# Patient Record
Sex: MALE | Race: WHITE
[De-identification: names, ages, dates, MRNs, and addresses within clinical notes are randomized per-mention and may not be internally consistent; named-entity substitution may affect disease eponyms.]

---

## 2018-11-29 NOTE — ED UPPER EXTREMITY
General


Stated Complaint:  FALL,R HAND PAIN


Source:  patient


Exam Limitations:  no limitations





History of Present Illness


Date Seen by Provider:  Nov 29, 2018


Time Seen by Provider:  17:05


Initial Comments


ER with reports of a fall down 2-3 steps about 2-3 hours ago. He has subsequent 

right hand pain where he landed. Pain is over the back of the right hand over 

the second third and fourth metacarpals where there is also swelling. No other 

injury.


Onset:  this afternoon


Severity:  moderate


Pain/Injury Location:  right hand


Method of Injury:  fell


Modifying Factors:  Worse With Movement





Allergies and Home Medications


Allergies


Coded Allergies:  


     No Known Drug Allergies (Unverified , 1/28/15)





Home Medications


Aripiprazole 10 Mg Tablet, 1 TAB DAILY, (Reported)


Diclofenac Potassium 50 Mg Tablet, 50 MG PO Q6H PRN for ankle pain/swelling


   Prescribed by: JONNY HILLS on 8/13/16 2007





Patient Home Medication List


Home Medication List Reviewed:  Yes





Review of Systems


Constitutional:  see HPI


EENTM:  see HPI


Respiratory:  no symptoms reported


Cardiovascular:  no symptoms reported


Genitourinary:  no symptoms reported


Musculoskeletal:  see HPI


Skin:  no symptoms reported


Psychiatric/Neurological:  No Symptoms Reported





Past Medical-Social-Family Hx


Patient Social History


Type Used:  Cigarettes


Recent Foreign Travel:  No


Contact w/Someone Who Travel:  No


Recent Hopitalizations:  No





Immunizations Up To Date


Tetanus Booster (TDap):  Unknown


PED Vaccines UTD:  No





Seasonal Allergies


Seasonal Allergies:  No





Past Medical History


Orthopedic


Reproductive Disorders:  No


Fractures





Physical Exam


Vital Signs





Vital Signs - First Documented








 11/29/18





 17:05


 


Temp 98.8


 


Pulse 83


 


Resp 16


 


B/P (MAP) 110/68 (82)


 


Pulse Ox 98


 


O2 Delivery Room Air





Capillary Refill :


Height, Weight, BMI


Height: 5'6"


Weight: 115lbs. oz. 52.908315pw;  BMI


Method:Stated


General Appearance:  WD/WN, no apparent distress


HEENT:  PERRL/EOMI, normal ENT inspection


Respiratory:  no respiratory distress, no accessory muscle use


Shoulder:  normal inspection, non-tender


Elbow/Forearm:  normal inspection, non-tender


Hand:  Right, swelling (swelling and tenderness over the dorsal aspect right 

hand second third and fourth mcp joints)


Neurologic/Tendon:  normal sensation, normal motor functions, normal tendon 

functions


Neurologic/Psychiatric:  alert, normal mood/affect, oriented x 3


Skin:  normal color, warm/dry





Progress/Results/Core Measures


Results/Orders


My Orders





Orders - GIANFRANCO GUZMAN


Hand, Right, 3 Views (11/29/18 17:10)


Hydrocodone/Apap 5/325 Tablet (Lortab 5 (11/29/18 17:15)





Medications Given in ED





Current Medications








 Medications  Dose


 Ordered  Sig/Cory


 Route  Start Time


 Stop Time Status Last Admin


Dose Admin


 


 Acetaminophen/


 Hydrocodone Bitart  1 tab  ONCE  ONCE


 PO  11/29/18 17:15


 11/29/18 17:16 DC 11/29/18 17:31


1 TAB








Vital Signs/I&O











 11/29/18 11/29/18





 17:05 17:42


 


Temp 98.8 98.8


 


Pulse 83 83


 


Resp 16 16


 


B/P (MAP) 110/68 (82) 110/68 (82)


 


Pulse Ox 98 98


 


O2 Delivery Room Air 











Departure


Impression





 Primary Impression:  


 Hand contusion


 Qualified Codes:  S60.221A - Contusion of right hand, initial encounter


Disposition:  01 HOME, SELF-CARE


Condition:  Stable





Departure-Patient Inst.


Decision time for Depature:  17:37


Referrals:  


NO,LOCAL PHYSICIAN (PCP/Family)


Primary Care Physician


Patient Instructions:  Contusion (DC)





Add. Discharge Instructions:  


1. ice, tylenol/motrin and elevate your hand.











GIANFRANCO GUZMAN Nov 29, 2018 17:07

## 2018-11-30 NOTE — XMS REPORT
[facility]

 Created on: 2016



Godwin Granado

External Reference #: [id number]

: 1995

Sex: Male



Demographics







 Address  [address] [place] [address]  [address]

 

 Home Phone  [phone]

 

 Preferred Language  Unknown

 

 Marital Status  Unknown

 

 Zoroastrianism Affiliation  Unknown

 

 Race  White

 

 Ethnic Group  Refused to Report





Author







 TU Joe

 

 Organization  [employer]

 

 Address  Unknown

 

 Phone  Unavailable







Care Team Providers







 Care Team Member Name  Role  Phone

 

 TU JOHNSON  CP  Unavailable



                                                                



Allergies, Adverse Reactions, Alerts

          





 Substance  Reaction  Event Type

 

 N.K.D.A.  Info Not Available  Non Drug Allergy



                                                                               
         



Problems

          





 Problem Type  Condition  Code  Onset Dates  Condition Status

 

 Problem  Paranoid personality disorder  F60.0     Active

 

 Problem  Other general medical examination for administrative purposes  V70.3 
    Active

 

 Problem  Unspecified mood [affective] disorder  F39     Active

 

 Assessment  Unspecified mood [affective] disorder  F39     Active

 

 Assessment  Attention deficit hyperactivity disorder (ADHD), combined type  
F90.2     Active



                                                                               
                                                 



Medications

          





 Medication  Code System  Code  Instructions  Start Date  End Date  Status  
Dosage

 

 Trileptal  NDC  [address]-16  300 MG Orally twice a day  Aug 15, 2016        
1 tablet

 

 Mirtazapine  NDC  [address]-40  30 MG Orally Once a day  Aug 15, 2016        
1 tablet before bedtime in the evening

 

 Diclofenac Sodium  NDC  [address]-32  75 MG Orally every 4-6 hours as needed 
          1 tablet



                                                                               
                             



Procedures

          





 Procedure  Coding System  Code  Date

 

  Office Visit, Est Pt., Level 3  CPT-4  06884  Aug 15, 2016



                                                                               
                   



Vital Signs

          





 Date/Time:  Aug 15, 2016

 

 Cardiac Monitoring Heart Rate  82 bpm

 

 Weight  124.8 lbs

 

 Height  66 in

 

 BMI  20.14 Index

 

 Blood Pressure Diastolic  60 mmHg

 

 Blood Pressure Systolic  80 mmHg



                                                                              



Results

          No Known Results                                                     
               



Summary Purpose

          [employer] Submission
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
## 2018-11-30 NOTE — XMS REPORT
[facility]

 Created on: 2018



GranadoGodwin diego

External Reference #: [id number]

: 1995

Sex: Male



Demographics







 Address  [address] [place]  [address]

 

 Preferred Language  Unknown

 

 Marital Status  Unknown

 

 Confucianist Affiliation  Unknown

 

 Race  Unknown

 

 Ethnic Group  Unknown





Author







 Author  MECHELLE  MICHOACANO

 

 [facility]

 

 Address  [address] [place]  [address]



 

 Phone  [phone]







Care Team Providers







 Care Team Member Name  Role  Phone

 

 MECHELLE,  MICHOACANO  Unavailable  [phone]







PROBLEMS







 Type  Condition  ICD9-CM Code  AZU56-IG Code  Onset Dates  Condition Status  
SNOMED Code

 

 Problem  Attention deficit hyperactivity disorder (ADHD), combined type     
F90.2     Active  930319353

 

 Problem  Unspecified mood [affective] disorder     F39     Active  857542591

 

 Problem  Paranoid personality disorder     F60.0     Active  58880959

 

 Problem  Other general medical examination for administrative purposes  V70.3 
       Active  90057528







ALLERGIES

No Known Allergies



ENCOUNTERS







 Encounter  Location  Date  Diagnosis

 

 [facility]  [address] [place] [phone]  26 Sep, 2018   

 

 [facility]  [address] [place] [phone]
5860  10 Jul, 2018  Attention deficit hyperactivity disorder (ADHD), combined 
type F90.2 ; Unspecified mood [affective] disorder F39 and Paranoid personality 
disorder F60.0

 

 [facility]  [id number] [address]    Attention deficit hyperactivity disorder (ADHD), combined 
type F90.2 ; Unspecified mood [affective] disorder F39 and Paranoid personality 
disorder F60.0

 

 [facility]  [address] [id number] [address]  30 Oct, 2017  Attention deficit hyperactivity disorder (ADHD), combined 
type F90.2 ; Unspecified mood [affective] disorder F39 and Paranoid personality 
disorder F60.0

 

 [facility]  [id number] [address]  24 Aug, 2017  Attention deficit hyperactivity disorder (ADHD), combined 
type F90.2 ; Unspecified mood [affective] disorder F39 and Paranoid personality 
disorder F60.0

 

 [facility]  [id number] [address]    Attention deficit hyperactivity disorder (ADHD), combined 
type F90.2 ; Unspecified mood [affective] disorder F39 and Paranoid personality 
disorder F60.0

 

 [facility]  [id number] [address]  21 Dec, 2016  Paranoid personality disorder F60.0 and Attention deficit 
hyperactivity disorder (ADHD), combined type F90.2

 

 [facility]  [id number] [address]  15 Aug, 2016  Unspecified mood [affective] disorder F39 and Attention 
deficit hyperactivity disorder (ADHD), combined type F90.2

 

 [facility]  [id number] [address]  18 May, 2016  Attention deficit hyperactivity disorder (ADHD), combined 
type F90.2 and Unspecified mood [affective] disorder F39

 

 [facility]  [id number] [address]  21 Mar, 2016  Unspecified mood [affective] disorder F39 and Paranoid 
personality disorder F60.0

 

 [facility]  [id number] [address]  15 Feb, 2013   







IMMUNIZATIONS

No Known Immunizations



SOCIAL HISTORY

Never Assessed



REASON FOR VISIT

 f/u-ANETA mart



PLAN OF CARE







 Activity  Details









  









 Follow Up  4 Weeks Reason: f/u







VITAL SIGNS







 Height  66 in  2018-07-10

 

 Weight  117.3 lbs  2018-07-10

 

 Heart Rate  97 bpm  2018-07-10

 

 Respiratory Rate  18   2018-07-10

 

 BMI  18.93 kg/m2  2018-07-10

 

 Blood pressure systolic  120 mmHg  2018-07-10

 

 Blood pressure diastolic  64 mmHg  2018-07-10







MEDICATIONS







 Medication  Instructions  Dosage  Frequency  Start Date  End Date  Duration  
Status

 

 Trazodone HCl 100 mg  Orally Once a day at bedtime  1 tablet      
       Active

 

 Oxcarbazepine 300 MG  Orally daily for one week, then 1 tablet twice a day  1 
tablet              Active







RESULTS

No Results



PROCEDURES

No Known procedures



INSTRUCTIONS





MEDICATIONS ADMINISTERED

No Known Medications



MEDICAL (GENERAL) HISTORY







 Type  Description  Date

 

 Hospitalization History  Denies any past psychiatric hospitalization.

## 2018-11-30 NOTE — XMS REPORT
[facility]

 Created on: 04/10/2018



Godwin Granado

External Reference #: [id number]

: 1995

Sex: Male



Demographics







 Address  [address] [place]  [address]

 

 Preferred Language  Unknown

 

 Marital Status  Unknown

 

 Nondenominational Affiliation  Unknown

 

 Race  Unknown

 

 Ethnic Group  Unknown





Author







 Author  TU Swenson

 

 Organization  [facility]

 

 Address  Unknown

 

 Phone  [phone]







Care Team Providers







 Care Team Member Name  Role  Phone

 

 TU Swenson  Unavailable  [phone]







PROBLEMS







 Type  Condition  ICD9-CM Code  WFJ65-LO Code  Onset Dates  Condition Status  
SNOMED Code

 

 Problem  Attention deficit hyperactivity disorder (ADHD), combined type     
F90.2     Active  711673940

 

 Problem  Unspecified mood [affective] disorder     F39     Active  033584086

 

 Problem  Paranoid personality disorder     F60.0     Active  89450087

 

 Problem  Other general medical examination for administrative purposes  V70.3 
       Active  32162149







ALLERGIES

No Known Allergies



ENCOUNTERS







 Encounter  Location  Date  Diagnosis

 

 [facility]  [id number] [place] [address]-
59[address]    Attention deficit hyperactivity disorder (ADHD), combined 
type F90.2 ; Unspecified mood [affective] disorder F39 and Paranoid personality 
disorder F60.0

 

 [facility]  [id number] [place] [address]-
[address]  30 Oct, 2017  Attention deficit hyperactivity disorder (ADHD), combined 
type F90.2 ; Unspecified mood [affective] disorder F39 and Paranoid personality 
disorder F60.0

 

 [facility]  [address] [place] [address]-
34[address]  24 Aug, 2017  Attention deficit hyperactivity disorder (ADHD), combined 
type F90.2 ; Unspecified mood [affective] disorder F39 and Paranoid personality 
disorder F60.0

 

 [facility]  [id number] [place] [address]-
58[address]    Attention deficit hyperactivity disorder (ADHD), combined 
type F90.2 ; Unspecified mood [affective] disorder F39 and Paranoid personality 
disorder F60.0

 

 [facility]  [address] [place] [address]-
4744  21 Dec, 2016  Paranoid personality disorder F60.0 and Attention deficit 
hyperactivity disorder (ADHD), combined type F90.2

 

 [id number] [address]  15 Aug, 2016  Unspecified mood [affective] disorder F39 and Attention 
deficit hyperactivity disorder (ADHD), combined type F90.2

 

 [facility]  [id number] [address]31  18 May, 2016  Attention deficit hyperactivity disorder (ADHD), combined 
type F90.2 and Unspecified mood [affective] disorder F39

 

 [id number] [address]
3555  21 Mar, 2016  Unspecified mood [affective] disorder F39 and Paranoid 
personality disorder F60.0

 

 [address] [address]  15 Feb, 2013   







IMMUNIZATIONS

No Known Immunizations



SOCIAL HISTORY

Never Assessed



REASON FOR VISIT

 f/reuben Baca MA



PLAN OF CARE







 Activity  Details









  









 Follow Up  4 Weeks Reason:







VITAL SIGNS







 Height  66 in  2017

 

 Weight  114.7 lbs  2017

 

 Heart Rate  76 bpm  2017

 

 Respiratory Rate  18   2017

 

 BMI  18.51 kg/m2  2017

 

 Blood pressure systolic  122 mmHg  2017

 

 Blood pressure diastolic  56 mmHg  2017







MEDICATIONS







 Medication  Instructions  Dosage  Frequency  Start Date  End Date  Duration  
Status

 

 Oxcarbazepine 300 MG  Orally Twice a day  1 tablet  12h       30 
day(s)  Active







RESULTS

No Results



PROCEDURES

No Known procedures



INSTRUCTIONS





MEDICATIONS ADMINISTERED

No Known Medications



MEDICAL (GENERAL) HISTORY







 Type  Description  Date

 

 Hospitalization History  Denies any past psychiatric hospitalization.

## 2018-11-30 NOTE — DIAGNOSTIC IMAGING REPORT
INDICATION: Fall with pain and swelling to the right hand and

wrist.



TIME OF EXAM: 05:42 p.m.



There is soft tissue swelling along the dorsum of the hand. The

distal radius and on are intact. The carpus appears intact.

Metacarpals and phalanges appear intact and no fractures are

seen.



IMPRESSION: Soft tissue swelling. No acute bony abnormality is

detected.



Dictated by: 



  Dictated on workstation # [id number]

## 2018-11-30 NOTE — XMS REPORT
[facility]

 Created on: 2018



GranadoGodwin diego

External Reference #: [id number]

: 1995

Sex: Male



Demographics







 Address  [address] [place]  [address]

 

 Preferred Language  Unknown

 

 Marital Status  Unknown

 

 Anglican Affiliation  Unknown

 

 Race  Unknown

 

 Ethnic Group  Unknown





Author







 Author  MECHELLE  MICHOACANO

 

 [facility]

 

 Address  [address] [place]  [address]



 

 Phone  [phone]







Care Team Providers







 Care Team Member Name  Role  Phone

 

 MECHELLE,  MICHOACANO  Unavailable  [phone]







PROBLEMS







 Type  Condition  ICD9-CM Code  HNB63-PC Code  Onset Dates  Condition Status  
SNOMED Code

 

 Problem  Attention deficit hyperactivity disorder (ADHD), combined type     
F90.2     Active  104589689

 

 Problem  Unspecified mood [affective] disorder     F39     Active  667929884

 

 Problem  Paranoid personality disorder     F60.0     Active  29886663

 

 Problem  Other general medical examination for administrative purposes  V70.3 
       Active  87103830







ALLERGIES

No Known Allergies



ENCOUNTERS







 Encounter  Location  Date  Diagnosis

 

 [facility]  [id number] [place] [phone]-
[phone]    Attention deficit hyperactivity disorder (ADHD), combined 
type F90.2 ; Unspecified mood [affective] disorder F39 and Paranoid personality 
disorder F60.0

 

 [facility]  [id number] [place] [address]
[phone]  30 Oct, 2017  Attention deficit hyperactivity disorder (ADHD), combined 
type F90.2 ; Unspecified mood [affective] disorder F39 and Paranoid personality 
disorder F60.0

 

 [facility]  [address] [place] [address]872  24 Aug, 2017  Attention deficit hyperactivity disorder (ADHD), combined 
type F90.2 ; Unspecified mood [affective] disorder F39 and Paranoid personality 
disorder F60.0

 

 [facility]  [id number] [place] [address]
3032    Attention deficit hyperactivity disorder (ADHD), combined 
type F90.2 ; Unspecified mood [affective] disorder F39 and Paranoid personality 
disorder F60.0

 

 [facility]  [id number] [place] [phone]
[phone]  21 Dec, 2016  Paranoid personality disorder F60.0 and Attention deficit 
hyperactivity disorder (ADHD), combined type F90.2

 

 [id number], [place] [address]
1214  15 Aug, 2016  Unspecified mood [affective] disorder F39 and Attention 
deficit hyperactivity disorder (ADHD), combined type F90.2

 

 [id number] [address]  18 May, 2016  Attention deficit hyperactivity disorder (ADHD), combined 
type F90.2 and Unspecified mood [affective] disorder F39

 

 [id number] [address]  21 Mar, 2016  Unspecified mood [affective] disorder F39 and Paranoid 
personality disorder F60.0

 

 [id number] [address]  15 Feb, 2013   







IMMUNIZATIONS

No Known Immunizations



SOCIAL HISTORY

Never Assessed



REASON FOR VISIT

 f/u--ANGEL Lee MA



PLAN OF CARE







 Activity  Details









  









 Follow Up  3 Months Reason: f/u







VITAL SIGNS







 Height  66 in  2017-10-30

 

 Weight  121.5 lbs  2017-10-30

 

 Heart Rate  80 bpm  2017-10-30

 

 Respiratory Rate  20   2017-10-30

 

 BMI  19.61 kg/m2  2017-10-30

 

 Blood pressure systolic  120 mmHg  2017-10-30

 

 Blood pressure diastolic  82 mmHg  2017-10-30







MEDICATIONS







 Medication  Instructions  Dosage  Frequency  Start Date  End Date  Duration  
Status

 

 Trazodone HCl 50 mg  Orally Once a day at bedtime as needed for sleep  1 
tablet     24 Aug, 2017        Active

 

 Oxcarbazepine 300 MG  Orally Twice a day  1 tablet  12h          
Active







RESULTS

No Results



PROCEDURES

No Known procedures



INSTRUCTIONS





MEDICATIONS ADMINISTERED

No Known Medications



MEDICAL (GENERAL) HISTORY







 Type  Description  Date

 

 Hospitalization History  Denies any past psychiatric hospitalization.

## 2018-11-30 NOTE — XMS REPORT
[facility]

 Created on: 2018



GranadoGodwin diego

External Reference #: [id number]

: 1995

Sex: Male



Demographics







 Address  [address] [place]  [address]

 

 Preferred Language  Unknown

 

 Marital Status  Unknown

 

 Anabaptist Affiliation  Unknown

 

 Race  Unknown

 

 Ethnic Group  Unknown





Author







 Author  MECHELLE  MICHOACANO

 

 [facility]

 

 Address  [address] [place]  [address]



 

 Phone  [phone]







Care Team Providers







 Care Team Member Name  Role  Phone

 

 MECHELLE,  MICHOACANO  Unavailable  [phone]







PROBLEMS







 Type  Condition  ICD9-CM Code  REG44-KU Code  Onset Dates  Condition Status  
SNOMED Code

 

 Problem  Attention deficit hyperactivity disorder (ADHD), combined type     
F90.2     Active  387692883

 

 Problem  Unspecified mood [affective] disorder     F39     Active  089532014

 

 Problem  Paranoid personality disorder     F60.0     Active  43902369

 

 Problem  Other general medical examination for administrative purposes  V70.3 
       Active  06680918







ALLERGIES

No Known Allergies



ENCOUNTERS







 Encounter  Location  Date  Diagnosis

 

 [facility]  [address] [place] [phone]     

 

 [facility]  [id number] [place] [address]-
[phone]    Attention deficit hyperactivity disorder (ADHD), combined 
type F90.2 ; Unspecified mood [affective] disorder F39 and Paranoid personality 
disorder F60.0

 

 [facility]  [id number], [place] [address]
28[address]  30 Oct, 2017  Attention deficit hyperactivity disorder (ADHD), combined 
type F90.2 ; Unspecified mood [affective] disorder F39 and Paranoid personality 
disorder F60.0

 

 [facility]  [address], [place] [address]  24 Aug, 2017  Attention deficit hyperactivity disorder (ADHD), combined 
type F90.2 ; Unspecified mood [affective] disorder F39 and Paranoid personality 
disorder F60.0

 

 [facility]  [id number], [place] [address]
6273    Attention deficit hyperactivity disorder (ADHD), combined 
type F90.2 ; Unspecified mood [affective] disorder F39 and Paranoid personality 
disorder F60.0

 

 [facility]  [id number] [address]  21 Dec, 2016  Paranoid personality disorder F60.0 and Attention deficit 
hyperactivity disorder (ADHD), combined type F90.2

 

 [facility]  [id number], [place] [address]  15 Aug, 2016  Unspecified mood [affective] disorder F39 and Attention 
deficit hyperactivity disorder (ADHD), combined type F90.2

 

 [facility]  [address], [place] [address]
6168  18 May, 2016  Attention deficit hyperactivity disorder (ADHD), combined 
type F90.2 and Unspecified mood [affective] disorder F39

 

 [facility]  [address], [place] [address]-
5121  21 Mar, 2016  Unspecified mood [affective] disorder F39 and Paranoid 
personality disorder F60.0

 

 [address] [address]  15 Feb, 2013   







IMMUNIZATIONS

No Known Immunizations



SOCIAL HISTORY

Never Assessed



REASON FOR VISIT

VICKI levy/reuben - MAHOGANY Bains



PLAN OF CARE







 Activity  Details









  









 Follow Up  4 Weeks Reason: f/u







VITAL SIGNS







 Height  66 in  2018

 

 Weight  113.2 lbs  2018

 

 Heart Rate  84 bpm  2018

 

 Respiratory Rate  20   2018

 

 BMI  18.27 kg/m2  2018

 

 Blood pressure systolic  110 mmHg  2018

 

 Blood pressure diastolic  52 mmHg  2018







MEDICATIONS







 Medication  Instructions  Dosage  Frequency  Start Date  End Date  Duration  
Status

 

 Oxcarbazepine 300 MG  Orally daily for one week, then 1 tablet twice a day  1 
tablet           30 days  Active

 

 Trazodone HCl 100 mg  Orally Once a day at bedtime  1 tablet      
    30 days  Active







RESULTS

No Results



PROCEDURES

No Known procedures



INSTRUCTIONS





MEDICATIONS ADMINISTERED

No Known Medications



MEDICAL (GENERAL) HISTORY







 Type  Description  Date

 

 Hospitalization History  Denies any past psychiatric hospitalization.

## 2018-11-30 NOTE — XMS REPORT
[facility]

 Created on: 2017



GranadoGodwin diego

External Reference #: [id number]

: 1995

Sex: Male



Demographics







 Address  [address] [place] [address]  [address]

 

 Preferred Language  Unknown

 

 Marital Status  Unknown

 

 Cheondoism Affiliation  Unknown

 

 Race  Unknown

 

 Ethnic Group  Unknown





Author







 Author  TU JOHNSON

 

 Organization  [facility]

 

 Address  Unknown

 

 Phone  [phone]







Care Team Providers







 Care Team Member Name  Role  Phone

 

 TU JOHNSON  Unavailable  [phone]







PROBLEMS







 Type  Condition  ICD9-CM Code  LIL52-GR Code  Onset Dates  Condition Status  
SNOMED Code

 

 Problem  Attention deficit hyperactivity disorder (ADHD), combined type     
F90.2     Active  536118390

 

 Problem  Unspecified mood [affective] disorder     F39     Active  953935940

 

 Problem  Paranoid personality disorder     F60.0     Active  57510414

 

 Problem  Other general medical examination for administrative purposes  V70.3 
       Active  15347654







ALLERGIES

Unknown Allergies



SOCIAL HISTORY

No smoking Hx information available



PLAN OF CARE







 Activity  Details









  









 Follow Up  6 Weeks Reason:







VITAL SIGNS







 Height  66 in  2016

 

 Weight  128.2 lbs  2016

 

 Heart Rate  86 bpm  2016

 

 Respiratory Rate  20   2016

 

 BMI  20.69 kg/m2  2016

 

 Blood pressure systolic  128 mmHg  2016

 

 Blood pressure diastolic  74 mmHg  2016







MEDICATIONS







 Medication  Instructions  Dosage  Frequency  Start Date  End Date  Duration  
Status

 

 Loxapine Succinate 10 MG  Orally Twice a day  1 capsule  12h  21 Dec, 2016     
30 day(s)  Active

 

 Mirtazapine 30 MG  Orally Once a day  1 tablet before bedtime in the evening  
24h  15 Aug, 2016     30 days  Active







RESULTS

No Results



PROCEDURES







 Procedure  Date Ordered  Related Diagnosis  Body Site

 

 MH Office Visit, Est Pt., Level 3  Dec 21, 2016      







IMMUNIZATIONS

No Known Immunizations

## 2018-11-30 NOTE — XMS REPORT
[facility]

 Created on: 2018



GranadoGodwin diego

External Reference #: [id number]

: 1995

Sex: Male



Demographics







 Address  [address] [place]  [address]

 

 Preferred Language  Unknown

 

 Marital Status  Unknown

 

 Sabianism Affiliation  Unknown

 

 Race  Unknown

 

 Ethnic Group  Unknown





Author







 Author  MECHELLE  MICHOACANO

 

 [facility]

 

 Address  [address] [place]  [address]



 

 Phone  [phone]







Care Team Providers







 Care Team Member Name  Role  Phone

 

 MECHELLE,  MICHOACANO  Unavailable  [phone]







PROBLEMS







 Type  Condition  ICD9-CM Code  YQB99-WZ Code  Onset Dates  Condition Status  
SNOMED Code

 

 Problem  Attention deficit hyperactivity disorder (ADHD), combined type     
F90.2     Active  790807223

 

 Problem  Unspecified mood [affective] disorder     F39     Active  774274354

 

 Problem  Paranoid personality disorder     F60.0     Active  51977695

 

 Problem  Other general medical examination for administrative purposes  V70.3 
       Active  53635808







ALLERGIES

No Known Allergies



ENCOUNTERS







 Encounter  Location  Date  Diagnosis

 

 [facility]  [id number] [place] [address]-
9016    Attention deficit hyperactivity disorder (ADHD), combined 
type F90.2 ; Unspecified mood [affective] disorder F39 and Paranoid personality 
disorder F60.0

 

 [facility]  [id number] [place] [address]-
1235  30 Oct, 2017  Attention deficit hyperactivity disorder (ADHD), combined 
type F90.2 ; Unspecified mood [affective] disorder F39 and Paranoid personality 
disorder F60.0

 

 [facility]  [address] [place] [address]-
6793  24 Aug, 2017  Attention deficit hyperactivity disorder (ADHD), combined 
type F90.2 ; Unspecified mood [affective] disorder F39 and Paranoid personality 
disorder F60.0

 

 [facility]  [id number] [place] [address]-
1549    Attention deficit hyperactivity disorder (ADHD), combined 
type F90.2 ; Unspecified mood [affective] disorder F39 and Paranoid personality 
disorder F60.0

 

 [facility]  [id number] [place] [address]-
0151  21 Dec, 2016  Paranoid personality disorder F60.0 and Attention deficit 
hyperactivity disorder (ADHD), combined type F90.2

 

 [facility]  [address] [address]-
3488  15 Aug, 2016  Unspecified mood [affective] disorder F39 and Attention 
deficit hyperactivity disorder (ADHD), combined type F90.2

 

 [facility]  [address] [address]-
1536  18 May, 2016  Attention deficit hyperactivity disorder (ADHD), combined 
type F90.2 and Unspecified mood [affective] disorder F39

 

 [address] [address]-
2052  21 Mar, 2016  Unspecified mood [affective] disorder F39 and Paranoid 
personality disorder F60.0

 

 [address] [address]
6952  15 Feb, 2013   







IMMUNIZATIONS

No Known Immunizations



SOCIAL HISTORY

Never Assessed



REASON FOR VISIT

 INTAKE Kristen



PLAN OF CARE







 Activity  Details









  









 Follow Up  2 Months Reason: f/u







VITAL SIGNS







 Height  66 in  2017

 

 Weight  117.0 lbs  2017

 

 Heart Rate  84 bpm  2017

 

 Respiratory Rate  20   2017

 

 BMI  18.88 kg/m2  2017

 

 Blood pressure systolic  130 mmHg  2017

 

 Blood pressure diastolic  68 mmHg  2017







MEDICATIONS







 Medication  Instructions  Dosage  Frequency  Start Date  End Date  Duration  
Status

 

 Oxcarbazepine 300 MG  Orally Twice a day  1 tablet  12h          
Active

 

 Trazodone HCl 50 mg  Orally Once a day at bedtime as needed for sleep  1 
tablet     24 Aug, 2017     30 day(s)  Active







RESULTS

No Results



PROCEDURES

No Known procedures



INSTRUCTIONS





MEDICATIONS ADMINISTERED

No Known Medications



MEDICAL (GENERAL) HISTORY







 Type  Description  Date

 

 Hospitalization History  Denies any past psychiatric hospitalization.

## 2021-06-16 ENCOUNTER — HOSPITAL ENCOUNTER (EMERGENCY)
Dept: HOSPITAL 75 - ER | Age: 26
Discharge: HOME | End: 2021-06-16
Payer: MEDICAID

## 2021-06-16 VITALS — WEIGHT: 121.25 LBS | BODY MASS INDEX: 19.72 KG/M2 | HEIGHT: 65.91 IN

## 2021-06-16 VITALS — DIASTOLIC BLOOD PRESSURE: 80 MMHG | SYSTOLIC BLOOD PRESSURE: 110 MMHG

## 2021-06-16 DIAGNOSIS — T15.11XA: Primary | ICD-10-CM

## 2021-06-16 DIAGNOSIS — Z23: ICD-10-CM

## 2021-06-16 PROCEDURE — 90715 TDAP VACCINE 7 YRS/> IM: CPT

## 2021-06-16 PROCEDURE — 70480 CT ORBIT/EAR/FOSSA W/O DYE: CPT

## 2021-06-16 NOTE — DIAGNOSTIC IMAGING REPORT
CLINICAL INDICATION: 

Patient fell on campfire grate and hit bottom of right eye. A

metal grate hook was stuck in the lower eyelid and the patient

pulled it out. There is redness and swelling at bottom of eye.



EXAM: 

Axial CT scan of the orbits performed without IV contrast.

Sagittal and coronal reformatted images were created.



COMPARISON: 

None.



FINDINGS:

There is no acute fracture involving the visualized orbits and

maxillofacial structures. There is a partially visualized

moderate mucus retention cyst in the right maxillary sinus. The

mastoid air cells are clear. There is debris within the bilateral

external auditory canals which may represent ear plugs versus

cerumen. There is a small amount of right periorbital soft tissue

swelling, most pronounced in the lower eyelid/right upper malar

region. There is a small amount of subcutaneous air adjacent to

the medial and lateral eyelid/periorbital region. All of the

swelling and inflammation is preseptal. There is no retrobulbar

air or hematoma seen. There is no radiodense foreign object. The

globes are intact. The left orbit is unremarkable.



Limited visualization of the intracranial structures is

unremarkable.



IMPRESSION:

1: There is a small amount of right periorbital soft tissue

swelling, predominantly involving the lower eyelid and upper

right malar region, with a small amount of air seen in the

region. This likely correlates to the patient's history of

trauma. All of the swelling and air is preseptal in location.

There is no drainable fluid collection or retro-bulbar hematoma.

The globes are intact.



2: There is no fracture.



3: There is a moderate sized right maxillary sinus mucus

retention cyst.



Dictated by: 



  Dictated on workstation # [id number]

## 2021-06-16 NOTE — ED EENT
History of Present Illness


General


Chief Complaint:  Eye Problems


Stated Complaint:  EYE PIERCED


Source:  patient


Exam Limitations:  no limitations





History of Present Illness


Date Seen by Provider:  2021


Time Seen by Provider:  12:29


Initial Comments


This is a 26-year-old male who presents to the ER via POV with complaints of 

right eye injury while he was camping around 0830 this morning.  States that he 

was getting ready to put wood in the fire grate when he tripped and landed face 

forward getting the small metal grate hook caught in the lower lid of his right 

eye.  States that he pulled it out himself and once he was able to get a ride to

the hospital he presented to have it checked out.  He reports some blurred 

vision and tenderness only to touch in his lower eyelid region.  States that he 

had a little bit of greenish discharge shortly after the incident.  Denies any 

flashes of light or floaters.





Allergies and Home Medications


Allergies


Coded Allergies:  


     No Known Drug Allergies (Unverified , 1/28/15)





Home Medications


Aripiprazole 10 Mg Tablet, 1 TAB DAILY, (Reported)


Diclofenac Potassium 50 Mg Tablet, 50 MG PO Q6H PRN for ankle pain/swelling


   Prescribed by: JONNY HILLS on 16





Patient Home Medication List


Home Medication List Reviewed:  Yes





Review of Systems


Review of Systems


Constitutional:  no symptoms reported


Eyes:  See HPI


Ears:  No Symptoms Reported


Nose:  no symptoms reported


Mouth:  no symptoms reported


Throat:  no symptoms reported


Respiratory:  no symptoms reported


Cardiovascular:  no symptoms reported


Musculoskeletal:  no symptoms reported


Skin:  no symptoms reported


Neurological:  No Symptoms Reported


Hematologic/Lymphatic:  No Symptoms Reported


Immunological/Allergic:  no symptoms reported





Past Medical-Social-Family Hx


Patient Social History


Type Used:  Cigarettes


Recent Hopitalizations:  No





Immunizations Up To Date


Tetanus Booster (TDap):  Unknown


PED Vaccines UTD:  No





Seasonal Allergies


Seasonal Allergies:  No





Past Medical History


Surgeries:  Yes


Orthopedic


Respiratory:  No


Cardiac:  No


Neurological:  No


Reproductive Disorders:  No


Gastrointestinal:  No


Musculoskeletal:  Yes


Fractures


Endocrine:  No


Cancer:  No


Psychosocial:  No


Integumentary:  No


Blood Disorders:  No





Visual Acuity :  


   Eye Location:  Right


Physical Exam


Vital Signs





Vital Signs - First Documented








 21





 12:26


 


Pulse 100


 


Resp 18


 


B/P (MAP) 130/95 (107)


 


Pulse Ox 98


 


O2 Delivery Room Air








Height, Weight, BMI


Height: 5'6.00"


Weight: 120lbs. oz. 54.873638zd;  BMI


Method:Stated


General Appearance:  WD/WN, no apparent distress


Eyes:  right eye conjunctival inflammation, right eye lid inflammation (lower 

lid ); bilateral eye PERRL, bilateral eye EOMI


Ears:  bilateral ear auricle normal, bilateral ear canal normal


Nose:  normal inspection; No active bleeding, No discharge


Mouth/Throat:  normal mouth inspection, pharynx normal


Neck:  non-tender, full range of motion, supple


Cardiovascular:  regular rate, rhythm, no murmur


Respiratory:  lungs clear, normal breath sounds


Gastrointestinal:  normal bowel sounds, non tender, soft


Neurologic/Psychiatric:  no motor/sensory deficits, alert, normal mood/affect, 

oriented x 3


Skin:  normal color, warm/dry





Progress/Results/Core Measures


Results/Orders


My Orders





Orders - SARI GALEANO


Tetracaine  0.5% Ophth Sol Sdv (Tetracai (21 12:45)


Fluorescein Strips (Fluor-I-Strips) (21 12:45)


Balanced Salt Irrigation Soln (Bss Irrig (21 12:45)


Ct Orbit Wo (21 12:37)


Dipht,Pertuss(Acell),Tet Adult (Boostrix (21 13:15)


Rx-Tobramycin Ophth Drops (Rx-Tobrex 0.3 (21 13:39)





Vital Signs/I&O








Progress


Progress Note :  


Progress Note


Patient examined and in no acute distress.  Check for corneal abrasion no 

foreign bodies with foreseen and black light.  None were noted on exam.  

Tolerated okay.  Placed orders for CT orbits due to history of trauma.  CT shows

a small amount of right periorbital soft tissue swelling, predominantly 

involving the lower eyelid and upper right malar region, with a small amount of 

air seen in the


region.  Call Dr. Hernandez's office and spoke with nurse.  Requested patient be 

sent to the office after discharge.  Inquired if ocular pressures have already 

been obtained, informed her that they have not yet at this time.  She states 

that they will obtain ocular pressures in office.





Reviewed discharge plan of care with patient he seemed very upset that he needs 

to be seen elsewhere. States that he just wants to leave.  Strongly reinforced 

having him follow-up Dr. Jim's office as directed.  We will go ahead and give

tobramycin drops here as concerns if he will follow up were raised during 

discussion of plan. If he does follow up will defer to Dr. Hernandez's office if 

needs to continue the tobramycin drops.





Diagnostic Imaging





   Diagonstic Imaging:  CT


   Plain Films/CT/US/NM/MRI:  other


Comments


[facility].


[place], [place]





NAME:   AMARJIT LOPEZ


[facility] REC#:   [id number]


ACCOUNT#:   [id number]


PT STATUS:   DEP ER


:   1995


PHYSICIAN:   SARI GALEANO


ADMIT DATE:   21/ER


***Signed***


Date of Exam:21





CT ORBIT WO








CLINICAL INDICATION: 


Patient fell on campfire grate and hit bottom of right eye. A


metal grate hook was stuck in the lower eyelid and the patient


pulled it out. There is redness and swelling at bottom of eye.





EXAM: 


Axial CT scan of the orbits performed without IV contrast.


Sagittal and coronal reformatted images were created.





COMPARISON: 


None.





FINDINGS:


There is no acute fracture involving the visualized orbits and


maxillofacial structures. There is a partially visualized


moderate mucus retention cyst in the right maxillary sinus. The


mastoid air cells are clear. There is debris within the bilateral


external auditory canals which may represent ear plugs versus


cerumen. There is a small amount of right periorbital soft tissue


swelling, most pronounced in the lower eyelid/right upper malar


region. There is a small amount of subcutaneous air adjacent to


the medial and lateral eyelid/periorbital region. All of the


swelling and inflammation is preseptal. There is no retrobulbar


air or hematoma seen. There is no radiodense foreign object. The


globes are intact. The left orbit is unremarkable.





Limited visualization of the intracranial structures is


unremarkable.





IMPRESSION:


1: There is a small amount of right periorbital soft tissue


swelling, predominantly involving the lower eyelid and upper


right malar region, with a small amount of air seen in the


region. This likely correlates to the patient's history of


trauma. All of the swelling and air is preseptal in location.


There is no drainable fluid collection or retro-bulbar hematoma.


The globes are intact.





2: There is no fracture.





3: There is a moderate sized right maxillary sinus mucus


retention cyst.





Dictated by: 





  Dictated on workstation # [id number]








Dict:   21 1314


Trans:   21 1724


 [id number]





Interpreted by:     GUERO GODOY MD


Electronically signed by: GUERO GODOY MD 21 1724





Departure


Impression





   Primary Impression:  


   Foreign body in conjunctival sac


   Additional Impression:  


   Blurred vision


Disposition:  01 HOME, SELF-CARE


Condition:  Stable





Departure-Patient Inst.


Decision time for Depature:  13:41


Referrals:  


[facility] (PCP/Family)


Primary Care Physician


Patient Instructions:  Eye Contusion (DC), Foreign Body in Eye





Add. Discharge Instructions:  


Plan: 


1. Follow up with Dr. Hernandez office immediately after leaving ER. 


2. Return for any new, concerning, or worsening symptoms. 


3. Your tetanus was updated today. 





All discharge instructions reviewed with patient and/or family. Voiced 

understanding.











SARI GALEANO APRN           2021 12:45

## 2021-08-25 ENCOUNTER — HOSPITAL ENCOUNTER (EMERGENCY)
Dept: HOSPITAL 75 - ER | Age: 26
Discharge: HOME | End: 2021-08-25
Payer: MEDICAID

## 2021-08-25 VITALS — DIASTOLIC BLOOD PRESSURE: 68 MMHG | SYSTOLIC BLOOD PRESSURE: 137 MMHG

## 2021-08-25 VITALS — HEIGHT: 65.75 IN | WEIGHT: 119.05 LBS | BODY MASS INDEX: 19.36 KG/M2

## 2021-08-25 DIAGNOSIS — F17.200: ICD-10-CM
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
## 2021-08-25 DIAGNOSIS — S61.215A: ICD-10-CM

## 2021-08-25 DIAGNOSIS — S61.213A: Primary | ICD-10-CM

## 2021-08-25 PROCEDURE — 99283 EMERGENCY DEPT VISIT LOW MDM: CPT

## 2021-08-25 NOTE — ED UPPER EXTREMITY
General


Chief Complaint:  Laceration


Stated Complaint:  MULTIPLE FINGER LAC / L HAND / MACHETE


Nursing Triage Note:  


STATES HE FELL INTO A MACHETE CAUSING A LACERATION TO HIS RIGHT 3RD AND 4TH 


FINGER.


Source:  patient


Exam Limitations:  no limitations





History of Present Illness


Date Seen by Provider:  Aug 25, 2021


Time Seen by Provider:  18:21


Initial Comments


Patient lacerated the left middle and ring finger when he fell into a machete 

just prior to arrival.


Onset:  just prior to arrival


Severity:  moderate


Pain/Injury Location:  left 3rd finger, left 4th finger


Method of Injury:  unknown


Modifying Factors:  Worse With Movement





Allergies and Home Medications


Allergies


Coded Allergies:  


     No Known Drug Allergies (Unverified , 1/28/15)





Home Medications


Aripiprazole 10 Mg Tablet, 1 TAB DAILY, (Reported)


Diclofenac Potassium 50 Mg Tablet, 50 MG PO Q6H PRN for ankle pain/swelling


   Prescribed by: JONNY HILLS on 8/13/16 2007





Patient Home Medication List


Home Medication List Reviewed:  Yes





Review of Systems


Constitutional:  see HPI


EENTM:  see HPI


Respiratory:  no symptoms reported


Cardiovascular:  no symptoms reported


Genitourinary:  no symptoms reported


Musculoskeletal:  no symptoms reported


Skin:  no symptoms reported


Psychiatric/Neurological:  No Symptoms Reported





Past Medical-Social-Family Hx


Patient Social History


Smokeless Tobacco Frequency:  Current Everyday User


Substance use?:  No





Immunizations Up To Date


Tetanus Booster (TDap):  Unknown


PED Vaccines UTD:  No





Seasonal Allergies


Seasonal Allergies:  No





Past Medical History


Surgeries:  Yes


Orthopedic


Respiratory:  No


Cardiac:  No


Neurological:  No


Reproductive Disorders:  No


Gastrointestinal:  No


Musculoskeletal:  Yes


Fractures


Endocrine:  No


Cancer:  No


Psychosocial:  No


Integumentary:  No


Blood Disorders:  No





Physical Exam


Vital Signs





Vital Signs - First Documented








 8/25/21





 17:21


 


Temp 37.2


 


Pulse 97


 


Resp 16


 


B/P (MAP) 127/79 (95)


 


Pulse Ox 98


 


O2 Delivery Room Air





Capillary Refill : Less Than 3 Seconds


Height, Weight, BMI


Height: 5'6.00"


Weight: 120lbs. oz. 54.399277zn; 19.00 BMI


Method:Stated


General Appearance:  WD/WN, no apparent distress


HEENT:  PERRL/EOMI, normal ENT inspection


Respiratory:  no respiratory distress, no accessory muscle use


Shoulder:  normal inspection, non-tender


Elbow/Forearm:  normal inspection, non-tender


Wrist:  Yes normal inspection, Yes non-tender


Hand:  Left (To the palmar/ulnar side of the middle phalanx of the middle finger

and ring finger there is a 2 cm laceration.  Distal sensation is intact.  To the

middle finger there is pulsatile squirting of blood from the lacerated digital 

artery.)


Neurologic/Psychiatric:  alert, normal mood/affect, oriented x 3


Skin:  normal color, warm/dry





Progress/Results/Core Measures


Results/Orders


Vital Signs/I&O











 8/25/21





 17:21


 


Temp 37.2


 


Pulse 97


 


Resp 16


 


B/P (MAP) 127/79 (95)


 


Pulse Ox 98


 


O2 Delivery Room Air














Blood Pressure Mean:                    95











Departure


Communication (Admissions)


Family Conversation


States that he is homeless and is upset that he is missed the [employer] diner 

tonight.  We will give him a meal tray here.


1922-laceration repair note


Local anesthesia totaling 7 mL of 1% lidocaine without epinephrine was used.  

Lacerations were thoroughly irrigated.  Middle finger closed with 7 simple 

interrupted sutures size 5-0 Prolene which achieved hemostasis.  The ring finger

had 4 sutures.





Impression





   Primary Impression:  


   Finger laceration


Disposition:  01 HOME, SELF-CARE


Condition:  Stable





Departure-Patient Inst.


Decision time for Depature:  19:24


Referrals:  


[facility] [place]


Primary Care Physician


Patient Instructions:  Laceration Repair With Stitches ED





Add. Discharge Instructions:  


1.  Return to ER for any concerns.  Return to ER in about 7 days and will take 

the stitches out.





All discharge instructions reviewed with patient and/or family. Voiced 

understanding.











GIANFRANCO GUZMAN APRN             Aug 25, 2021 19:25

## 2022-01-05 ENCOUNTER — HOSPITAL ENCOUNTER (EMERGENCY)
Dept: HOSPITAL 75 - ER | Age: 27
Discharge: HOME | End: 2022-01-05
Payer: MEDICAID

## 2022-01-05 VITALS — HEIGHT: 66.02 IN | WEIGHT: 143.08 LBS | BODY MASS INDEX: 22.99 KG/M2

## 2022-01-05 VITALS — SYSTOLIC BLOOD PRESSURE: 103 MMHG | DIASTOLIC BLOOD PRESSURE: 76 MMHG
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
## 2022-01-05 DIAGNOSIS — F17.210: ICD-10-CM

## 2022-01-05 DIAGNOSIS — S01.81XA: Primary | ICD-10-CM

## 2022-01-05 PROCEDURE — 99282 EMERGENCY DEPT VISIT SF MDM: CPT
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
## 2022-05-26 ENCOUNTER — HOSPITAL ENCOUNTER (EMERGENCY)
Dept: HOSPITAL 75 - ER | Age: 27
Discharge: LEFT BEFORE DISCHARGE | End: 2022-05-26
Payer: MEDICAID

## 2022-05-26 VITALS — WEIGHT: 132.28 LBS | BODY MASS INDEX: 21.51 KG/M2 | HEIGHT: 65.75 IN

## 2022-05-26 VITALS — DIASTOLIC BLOOD PRESSURE: 90 MMHG | SYSTOLIC BLOOD PRESSURE: 128 MMHG

## 2022-05-26 DIAGNOSIS — T25.222A: ICD-10-CM

## 2022-05-26 DIAGNOSIS — T20.20XA: Primary | ICD-10-CM

## 2022-05-26 DIAGNOSIS — T23.242A: ICD-10-CM

## 2022-05-26 DIAGNOSIS — T23.241A: ICD-10-CM

## 2022-05-26 DIAGNOSIS — T25.221A: ICD-10-CM

## 2022-05-26 DIAGNOSIS — F17.210: ICD-10-CM

## 2022-05-26 DIAGNOSIS — Z23: ICD-10-CM

## 2022-05-26 DIAGNOSIS — Z28.310: ICD-10-CM

## 2022-05-26 DIAGNOSIS — Z20.822: ICD-10-CM
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
## 2022-05-26 LAB
ALBUMIN SERPL-MCNC: 4.5 GM/DL (ref 3.2–4.5)
ALP SERPL-CCNC: 82 U/L (ref 40–136)
ALT SERPL-CCNC: 27 U/L (ref 0–55)
BASOPHILS # BLD AUTO: 0.1 10^3/UL (ref 0–0.1)
BASOPHILS NFR BLD AUTO: 1 % (ref 0–10)
BILIRUB SERPL-MCNC: 0.6 MG/DL (ref 0.1–1)
BUN/CREAT SERPL: 21
CALCIUM SERPL-MCNC: 9.4 MG/DL (ref 8.5–10.1)
CHLORIDE SERPL-SCNC: 104 MMOL/L (ref 98–107)
CO2 SERPL-SCNC: 26 MMOL/L (ref 21–32)
CREAT SERPL-MCNC: 0.7 MG/DL (ref 0.6–1.3)
EOSINOPHIL # BLD AUTO: 0.8 10^3/UL (ref 0–0.3)
EOSINOPHIL NFR BLD AUTO: 10 % (ref 0–10)
GFR SERPLBLD BASED ON 1.73 SQ M-ARVRAT: 129 ML/MIN
GLUCOSE SERPL-MCNC: 122 MG/DL (ref 70–105)
HCT VFR BLD CALC: 42 % (ref 40–54)
HGB BLD-MCNC: 14.2 G/DL (ref 13.3–17.7)
LYMPHOCYTES # BLD AUTO: 3 10^3/UL (ref 1–4)
LYMPHOCYTES NFR BLD AUTO: 36 % (ref 12–44)
MANUAL DIFFERENTIAL PERFORMED BLD QL: NO
MCH RBC QN AUTO: 30 PG (ref 25–34)
MCHC RBC AUTO-ENTMCNC: 34 G/DL (ref 32–36)
MCV RBC AUTO: 88 FL (ref 80–99)
MONOCYTES # BLD AUTO: 0.6 10^3/UL (ref 0–1)
MONOCYTES NFR BLD AUTO: 7 % (ref 0–12)
NEUTROPHILS # BLD AUTO: 3.7 10^3/UL (ref 1.8–7.8)
NEUTROPHILS NFR BLD AUTO: 45 % (ref 42–75)
PLATELET # BLD: 231 10^3/UL (ref 130–400)
PMV BLD AUTO: 11.7 FL (ref 9–12.2)
POTASSIUM SERPL-SCNC: 3.7 MMOL/L (ref 3.6–5)
PROT SERPL-MCNC: 7.2 GM/DL (ref 6.4–8.2)
SODIUM SERPL-SCNC: 142 MMOL/L (ref 135–145)
WBC # BLD AUTO: 8.2 10^3/UL (ref 4.3–11)

## 2022-05-26 PROCEDURE — 87636 SARSCOV2 & INF A&B AMP PRB: CPT

## 2022-05-26 PROCEDURE — 71045 X-RAY EXAM CHEST 1 VIEW: CPT

## 2022-05-26 PROCEDURE — 36415 COLL VENOUS BLD VENIPUNCTURE: CPT

## 2022-05-26 PROCEDURE — 90715 TDAP VACCINE 7 YRS/> IM: CPT

## 2022-05-26 PROCEDURE — 80053 COMPREHEN METABOLIC PANEL: CPT

## 2022-05-26 PROCEDURE — 99291 CRITICAL CARE FIRST HOUR: CPT

## 2022-05-26 PROCEDURE — 85025 COMPLETE CBC W/AUTO DIFF WBC: CPT

## 2022-05-26 PROCEDURE — 80320 DRUG SCREEN QUANTALCOHOLS: CPT
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
## 2022-05-26 NOTE — DIAGNOSTIC IMAGING REPORT
EXAMINATION: Chest 1 view.



HISTORY: James.



COMPARISON: None available.



FINDINGS: Heart size and pulmonary vasculature are normal. The

lungs are clear without consolidation, pleural effusion or

pneumothorax. The osseous structures are intact.



IMPRESSION: No acute radiographic abnormality in the chest. 



Dictated by: 



  Dictated on workstation # [id number]